# Patient Record
Sex: FEMALE | Race: WHITE
[De-identification: names, ages, dates, MRNs, and addresses within clinical notes are randomized per-mention and may not be internally consistent; named-entity substitution may affect disease eponyms.]

---

## 2017-08-20 VITALS — SYSTOLIC BLOOD PRESSURE: 149 MMHG | DIASTOLIC BLOOD PRESSURE: 76 MMHG

## 2017-10-10 ENCOUNTER — HOSPITAL ENCOUNTER (OUTPATIENT)
Dept: HOSPITAL 61 - RT | Age: 52
Discharge: HOME | End: 2017-10-10
Attending: FAMILY MEDICINE
Payer: MEDICARE

## 2017-10-10 DIAGNOSIS — G47.33: Primary | ICD-10-CM

## 2017-10-10 PROCEDURE — 95810 POLYSOM 6/> YRS 4/> PARAM: CPT

## 2017-10-11 NOTE — SLEEP
DATE OF STUDY:  10/10/2017



SLEEP STUDY



ATTENDING PHYSICIAN:  Dr. Valdivia.



The patient is 52 years old, who weighs 324 pounds with a BMI of 51.  The

patient underwent sleep study at [facility] to rule out RABIA.  This was

a diagnostic study.



During the night study, the patient spent 247 minutes in bed and slept for 171

minutes with a sleep efficiency of 69%.  Sleep latency was 27 minutes with an

absent REM sleep.  Overall, sleep architecture showed increased stage I sleep,

reduced stage II sleep, increased slow wave and absent REM sleep.



During the night study, the patient had 8 obstructive apneas, no mixed or

central apneas.  There were 65 hypopneas.  The patient's apnea-hypopnea index

was 26 per hour.  There was no significant supine sleep seen and no REM sleep

seen.  Review of nocturnal oximetry study revealed a mean oxygen saturation of

95% with the lowest of 70%.  A 59% of time oxygen saturation remained between

80% and 89%.



EKG monitoring revealed irregular rhythm c/w A-fib.  PACs were seen frequently.
  No

sustained arrhythmias were observed.



PLMS were not seen.



The patient did met the criteria for CPAP initiation; however, when technician

offered the CPAP, the patient began to cry profusely and wanted to leave.  The

patient appeared to have a panic attack.  As a result, CPAP could not be

initiated.



IMPRESSION:

1.  Moderate obstructive sleep apnea with an apnea-hypopnea index of 26 per

hour.  Absence of supine and REM sleep can underestimate the severity of sleep

apnea.

2.  Nocturnal hypoxia secondary to obstructive sleep apnea.

3.  No clinically significant periodic limb movements during sleep.



RECOMMENDATIONS:

1.  The patient should return to the sleep lab for CPAP titration.

2.  Once optimum CPAP pressure is achieved, then follow up in 4-6 weeks to

assess compliance with CPAP and to document clinical improvement.

3.  Avoid CNS depressants.

4.  Weight loss is strongly advised.

5.  Caution regarding driving until symptoms of sleep apnea resolve with the

above recommendations.

 



______________________________

WING SELF MD DR:  SARATH/neptali  JOB#:  [id number] / [id number]

DD:  10/11/2017 17:44  DT:  10/11/2017 18:46



chelita Valdivia Dr. 

[facility]

## 2017-11-28 ENCOUNTER — HOSPITAL ENCOUNTER (OUTPATIENT)
Dept: HOSPITAL 61 - CCL | Age: 52
Discharge: HOME | End: 2017-11-28
Attending: INTERNAL MEDICINE
Payer: MEDICARE

## 2017-11-28 VITALS — SYSTOLIC BLOOD PRESSURE: 98 MMHG | DIASTOLIC BLOOD PRESSURE: 57 MMHG

## 2017-11-28 DIAGNOSIS — Z79.01: ICD-10-CM
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
## 2017-11-28 DIAGNOSIS — E66.01: ICD-10-CM

## 2017-11-28 DIAGNOSIS — T82.191A: Primary | ICD-10-CM

## 2017-11-28 LAB
ANION GAP SERPL CALC-SCNC: 10 MMOL/L (ref 6–14)
BLOOD UREA NITROGEN: 26 MG/DL (ref 7–20)
CALCIUM: 9.1 MG/DL (ref 8.5–10.1)
CHLORIDE: 103 MMOL/L (ref 98–107)
CO2 SERPL-SCNC: 27 MMOL/L (ref 21–32)
CREAT SERPL-MCNC: 1 MG/DL (ref 0.6–1)
GFR SERPLBLD BASED ON 1.73 SQ M-ARVRAT: 58 ML/MIN
GLUCOSE SERPL-MCNC: 297 MG/DL (ref 70–99)
HCG SERPL-ACNC: 10.2 X10^3/UL (ref 4–11)
HEMATOCRIT: 45.9 % (ref 36–47)
HEMOGLOBIN: 14.7 G/DL (ref 12–15.5)
INR: 1 (ref 0.8–1.1)
MEAN CORPUSCULAR HEMOGLOBIN: 29 PG (ref 25–35)
MEAN CORPUSCULAR HGB CONC: 32 G/DL (ref 31–37)
MEAN CORPUSCULAR VOLUME: 91 FL (ref 79–100)
PLATELET COUNT: 205 X10^3/UL (ref 140–400)
POTASSIUM SERPL-SCNC: 3.9 MMOL/L (ref 3.5–5.1)
PROTHROMBIN TIME PATIENT: 12.6 SEC (ref 11.7–14)
RED BLOOD COUNT: 5.05 X10^6/UL (ref 3.5–5.4)
RED CELL DISTRIBUTION WIDTH: 15.7 % (ref 11.5–14.5)
SODIUM: 140 MMOL/L (ref 136–145)

## 2017-11-28 PROCEDURE — 33213 INSERT PULSE GEN DUAL LEADS: CPT

## 2017-11-28 PROCEDURE — 99152 MOD SED SAME PHYS/QHP 5/>YRS: CPT

## 2017-11-28 PROCEDURE — 80048 BASIC METABOLIC PNL TOTAL CA: CPT

## 2017-11-28 PROCEDURE — 33228 REMV&REPLC PM GEN DUAL LEAD: CPT

## 2017-11-28 PROCEDURE — 85027 COMPLETE CBC AUTOMATED: CPT

## 2017-11-28 PROCEDURE — 99153 MOD SED SAME PHYS/QHP EA: CPT

## 2017-11-28 PROCEDURE — 85610 PROTHROMBIN TIME: CPT

## 2017-11-28 PROCEDURE — 36415 COLL VENOUS BLD VENIPUNCTURE: CPT

## 2017-11-28 RX ADMIN — LIDOCAINE HYDROCHLORIDE,EPINEPHRINE BITARTRATE 1 ML: 20; .01 INJECTION, SOLUTION INFILTRATION; PERINEURAL at 09:46

## 2017-11-28 RX ADMIN — ONDANSETRON 1 MG: 2 INJECTION INTRAMUSCULAR; INTRAVENOUS at 09:46

## 2017-11-28 RX ADMIN — MIDAZOLAM HYDROCHLORIDE 1 MG: 1 INJECTION, SOLUTION INTRAMUSCULAR; INTRAVENOUS at 09:47

## 2017-11-28 RX ADMIN — VANCOMYCIN HYDROCHLORIDE 1 MLS/HR: 1 INJECTION, POWDER, FOR SOLUTION INTRAVENOUS at 08:49

## 2017-11-28 RX ADMIN — BACITRACIN 1 MLS/HR: 5000 INJECTION, POWDER, FOR SOLUTION INTRAMUSCULAR at 09:46

## 2017-11-28 RX ADMIN — FENTANYL CITRATE 1 MCG: 50 INJECTION INTRAMUSCULAR; INTRAVENOUS at 09:47

## 2017-12-20 ENCOUNTER — HOSPITAL ENCOUNTER (OUTPATIENT)
Dept: HOSPITAL 61 - RAD | Age: 52
Discharge: HOME | End: 2017-12-20
Attending: INTERNAL MEDICINE
Payer: MEDICARE

## 2017-12-20 DIAGNOSIS — Z95.0: ICD-10-CM

## 2017-12-20 DIAGNOSIS — I49.5: Primary | ICD-10-CM

## 2017-12-20 PROCEDURE — 71020: CPT

## 2017-12-20 NOTE — RAD
2 views of the Chest  12/20/2017 2:00 AM



Indication: SICK SINUS SYNDROME.



Comparison:  Chest radiograph August 17, 2017



Findings: No pneumothorax or pleural effusion is identified. No focal

infiltrates are seen. Heart size is normal. 2 pacemaking leads are noted

extending from the left subclavian approach, the distal ends of which appear

to be stable. The pacemaker generator is viewed en face on the frontal chest

radiograph and may have rotated within the pocket. Correlate with pacemaker

function.



Impression: Change in position of the pacemaker generator as described.

Correlate with pacemaker function. Otherwise stable chest.

## 2017-12-26 ENCOUNTER — HOSPITAL ENCOUNTER (INPATIENT)
Dept: HOSPITAL 61 - 2 SOUTH | Age: 52
LOS: 2 days | Discharge: HOME | DRG: 261 | End: 2017-12-28
Attending: INTERNAL MEDICINE | Admitting: INTERNAL MEDICINE
Payer: MEDICARE

## 2017-12-26 VITALS — SYSTOLIC BLOOD PRESSURE: 129 MMHG | DIASTOLIC BLOOD PRESSURE: 64 MMHG

## 2017-12-26 VITALS — SYSTOLIC BLOOD PRESSURE: 129 MMHG | DIASTOLIC BLOOD PRESSURE: 71 MMHG

## 2017-12-26 VITALS — SYSTOLIC BLOOD PRESSURE: 115 MMHG | DIASTOLIC BLOOD PRESSURE: 63 MMHG

## 2017-12-26 VITALS — SYSTOLIC BLOOD PRESSURE: 114 MMHG | DIASTOLIC BLOOD PRESSURE: 69 MMHG

## 2017-12-26 VITALS — BODY MASS INDEX: 45.99 KG/M2 | HEIGHT: 67 IN | WEIGHT: 293 LBS

## 2017-12-26 DIAGNOSIS — Z95.0: ICD-10-CM

## 2017-12-26 DIAGNOSIS — E11.9: ICD-10-CM

## 2017-12-26 DIAGNOSIS — T82.198A: Primary | ICD-10-CM

## 2017-12-26 DIAGNOSIS — Z88.8: ICD-10-CM

## 2017-12-26 DIAGNOSIS — I49.5: ICD-10-CM

## 2017-12-26 DIAGNOSIS — Z79.899: ICD-10-CM

## 2017-12-26 DIAGNOSIS — I10: ICD-10-CM

## 2017-12-26 DIAGNOSIS — Z79.4: ICD-10-CM

## 2017-12-26 DIAGNOSIS — F41.9: ICD-10-CM

## 2017-12-26 DIAGNOSIS — E66.01: ICD-10-CM

## 2017-12-26 DIAGNOSIS — E78.5: ICD-10-CM

## 2017-12-26 DIAGNOSIS — Z91.040: ICD-10-CM

## 2017-12-26 DIAGNOSIS — Z91.018: ICD-10-CM

## 2017-12-26 DIAGNOSIS — Z88.6: ICD-10-CM

## 2017-12-26 DIAGNOSIS — Z88.5: ICD-10-CM

## 2017-12-26 DIAGNOSIS — Z79.51: ICD-10-CM

## 2017-12-26 LAB
ALBUMIN SERPL-MCNC: 3.1 G/DL (ref 3.4–5)
ALBUMIN/GLOB SERPL: 0.7 {RATIO} (ref 1–1.7)
ALP SERPL-CCNC: 97 U/L (ref 46–116)
ALT SERPL-CCNC: 17 U/L (ref 14–59)
ANION GAP SERPL CALC-SCNC: 13 MMOL/L (ref 6–14)
AST SERPL-CCNC: 12 U/L (ref 15–37)
BACTERIA #/AREA URNS HPF: (no result) /HPF
BASOPHILS # BLD AUTO: 0.1 X10^3/UL (ref 0–0.2)
BASOPHILS NFR BLD: 1 % (ref 0–3)
BILIRUB SERPL-MCNC: 0.5 MG/DL (ref 0.2–1)
BILIRUB UR QL STRIP: (no result)
BUN SERPL-MCNC: 30 MG/DL (ref 7–20)
BUN/CREAT SERPL: 25 (ref 6–20)
CALCIUM SERPL-MCNC: 8.4 MG/DL (ref 8.5–10.1)
CHLORIDE SERPL-SCNC: 99 MMOL/L (ref 98–107)
CO2 SERPL-SCNC: 26 MMOL/L (ref 21–32)
CREAT SERPL-MCNC: 1.2 MG/DL (ref 0.6–1)
EOSINOPHIL NFR BLD: 1 % (ref 0–3)
ERYTHROCYTE [DISTWIDTH] IN BLOOD BY AUTOMATED COUNT: 16.2 % (ref 11.5–14.5)
GFR SERPLBLD BASED ON 1.73 SQ M-ARVRAT: 47 ML/MIN
GLOBULIN SER-MCNC: 4.2 G/DL (ref 2.2–3.8)
GLUCOSE SERPL-MCNC: 396 MG/DL (ref 70–99)
GLUCOSE UR STRIP-MCNC: >=1000 MG/DL
HCT VFR BLD CALC: 42.8 % (ref 36–47)
HGB BLD-MCNC: 13.9 G/DL (ref 12–15.5)
INR PPP: 1.1 (ref 0.8–1.1)
LYMPHOCYTES # BLD: 2.3 X10^3/UL (ref 1–4.8)
LYMPHOCYTES NFR BLD AUTO: 23 % (ref 24–48)
MCH RBC QN AUTO: 30 PG (ref 25–35)
MCHC RBC AUTO-ENTMCNC: 33 G/DL (ref 31–37)
MCV RBC AUTO: 92 FL (ref 79–100)
MONOCYTES NFR BLD: 6 % (ref 0–9)
NEUTROPHILS NFR BLD AUTO: 69 % (ref 31–73)
NITRITE UR QL STRIP: NEGATIVE
PH UR STRIP: 5.5 [PH]
PLATELET # BLD AUTO: 175 X10^3/UL (ref 140–400)
POTASSIUM SERPL-SCNC: 4.3 MMOL/L (ref 3.5–5.1)
PROT SERPL-MCNC: 7.3 G/DL (ref 6.4–8.2)
PROT UR STRIP-MCNC: NEGATIVE MG/DL
PROTHROMBIN TIME: 13.2 SEC (ref 11.7–14)
RBC # BLD AUTO: 4.65 X10^6/UL (ref 3.5–5.4)
RBC #/AREA URNS HPF: 0 /HPF (ref 0–2)
SODIUM SERPL-SCNC: 138 MMOL/L (ref 136–145)
SP GR UR STRIP: >=1.03
SQUAMOUS #/AREA URNS LPF: (no result) /LPF
UROBILINOGEN UR-MCNC: 1 MG/DL
WBC # BLD AUTO: 10.1 X10^3/UL (ref 4–11)
WBC #/AREA URNS HPF: (no result) /HPF (ref 0–4)

## 2017-12-26 PROCEDURE — C1769 GUIDE WIRE: HCPCS

## 2017-12-26 PROCEDURE — 85025 COMPLETE CBC W/AUTO DIFF WBC: CPT

## 2017-12-26 PROCEDURE — 82962 GLUCOSE BLOOD TEST: CPT

## 2017-12-26 PROCEDURE — 81001 URINALYSIS AUTO W/SCOPE: CPT

## 2017-12-26 PROCEDURE — 80053 COMPREHEN METABOLIC PANEL: CPT

## 2017-12-26 PROCEDURE — S0028 INJECTION, FAMOTIDINE, 20 MG: HCPCS

## 2017-12-26 PROCEDURE — 87040 BLOOD CULTURE FOR BACTERIA: CPT

## 2017-12-26 PROCEDURE — 85610 PROTHROMBIN TIME: CPT

## 2017-12-26 PROCEDURE — 36415 COLL VENOUS BLD VENIPUNCTURE: CPT

## 2017-12-26 PROCEDURE — 83735 ASSAY OF MAGNESIUM: CPT

## 2017-12-26 PROCEDURE — 71010: CPT

## 2017-12-26 RX ADMIN — NYSTATIN SCH APP: 100000 POWDER TOPICAL at 20:33

## 2017-12-26 RX ADMIN — ATORVASTATIN CALCIUM SCH MG: 40 TABLET, FILM COATED ORAL at 20:32

## 2017-12-26 RX ADMIN — INSULIN ASPART SCH UNITS: 100 INJECTION, SOLUTION INTRAVENOUS; SUBCUTANEOUS at 17:36

## 2017-12-26 RX ADMIN — TIZANIDINE SCH MG: 4 TABLET ORAL at 20:32

## 2017-12-26 RX ADMIN — DICLOFENAC SODIUM SCH APP: 10 GEL TOPICAL at 20:32

## 2017-12-26 RX ADMIN — INSULIN DETEMIR SCH UNITS: 100 INJECTION, SOLUTION SUBCUTANEOUS at 20:39

## 2017-12-26 RX ADMIN — GABAPENTIN SCH MG: 300 CAPSULE ORAL at 16:28

## 2017-12-26 RX ADMIN — NICOTINE SCH PATCH: 14 PATCH, EXTENDED RELEASE TOPICAL at 17:11

## 2017-12-26 RX ADMIN — GABAPENTIN SCH MG: 300 CAPSULE ORAL at 20:32

## 2017-12-26 RX ADMIN — VANCOMYCIN HYDROCHLORIDE SCH MLS/HR: 1 INJECTION, POWDER, FOR SOLUTION INTRAVENOUS at 22:38

## 2017-12-26 RX ADMIN — HYDROCODONE BITARTRATE AND ACETAMINOPHEN PRN TAB: 5; 325 TABLET ORAL at 15:15

## 2017-12-26 RX ADMIN — INSULIN ASPART SCH UNITS: 100 INJECTION, SOLUTION INTRAVENOUS; SUBCUTANEOUS at 17:35

## 2017-12-26 NOTE — PDOC
Provider Note


Provider Note


Please see paper copy of recent clinic visit for full details. 





52 y.o woman with multiple comorbidities underwent pacer generator change. Has 

had pacer migrate in the pocket superficially and position changed. Poor wound 

healing due to DM. Reported fevers at home and worsening erythema of the site. 


No significant change in pacer site since office visit. 





Will check CXR. 


Already plan for pacer pocket revision in OR with Dr. Esparza tomorrow. 


Cultures, labs and abx with vancomycin. 





Supportive care with pain control. Anticipate DC tomorrow depending procedure. 


NPO at midnight. 





Thanks.











MEAGAN GARCIA MD Dec 26, 2017 14:38

## 2017-12-27 VITALS — DIASTOLIC BLOOD PRESSURE: 97 MMHG | SYSTOLIC BLOOD PRESSURE: 142 MMHG

## 2017-12-27 VITALS — DIASTOLIC BLOOD PRESSURE: 69 MMHG | SYSTOLIC BLOOD PRESSURE: 136 MMHG

## 2017-12-27 VITALS — DIASTOLIC BLOOD PRESSURE: 65 MMHG | SYSTOLIC BLOOD PRESSURE: 132 MMHG

## 2017-12-27 VITALS — DIASTOLIC BLOOD PRESSURE: 55 MMHG | SYSTOLIC BLOOD PRESSURE: 92 MMHG

## 2017-12-27 VITALS — SYSTOLIC BLOOD PRESSURE: 133 MMHG | DIASTOLIC BLOOD PRESSURE: 53 MMHG

## 2017-12-27 VITALS — DIASTOLIC BLOOD PRESSURE: 44 MMHG | SYSTOLIC BLOOD PRESSURE: 99 MMHG

## 2017-12-27 VITALS — SYSTOLIC BLOOD PRESSURE: 127 MMHG | DIASTOLIC BLOOD PRESSURE: 59 MMHG

## 2017-12-27 VITALS — DIASTOLIC BLOOD PRESSURE: 54 MMHG | SYSTOLIC BLOOD PRESSURE: 121 MMHG

## 2017-12-27 VITALS — SYSTOLIC BLOOD PRESSURE: 119 MMHG | DIASTOLIC BLOOD PRESSURE: 67 MMHG

## 2017-12-27 VITALS — DIASTOLIC BLOOD PRESSURE: 74 MMHG | SYSTOLIC BLOOD PRESSURE: 147 MMHG

## 2017-12-27 VITALS — SYSTOLIC BLOOD PRESSURE: 132 MMHG | DIASTOLIC BLOOD PRESSURE: 67 MMHG

## 2017-12-27 VITALS — DIASTOLIC BLOOD PRESSURE: 54 MMHG | SYSTOLIC BLOOD PRESSURE: 101 MMHG

## 2017-12-27 PROCEDURE — 4B02XSZ MEASUREMENT OF CARDIAC PACEMAKER, EXTERNAL APPROACH: ICD-10-PCS | Performed by: THORACIC SURGERY (CARDIOTHORACIC VASCULAR SURGERY)

## 2017-12-27 PROCEDURE — 0JWT0PZ REVISION OF CARDIAC RHYTHM RELATED DEVICE IN TRUNK SUBCUTANEOUS TISSUE AND FASCIA, OPEN APPROACH: ICD-10-PCS | Performed by: THORACIC SURGERY (CARDIOTHORACIC VASCULAR SURGERY)

## 2017-12-27 RX ADMIN — ATORVASTATIN CALCIUM SCH MG: 40 TABLET, FILM COATED ORAL at 20:55

## 2017-12-27 RX ADMIN — FENTANYL CITRATE PRN MCG: 50 INJECTION INTRAMUSCULAR; INTRAVENOUS at 14:37

## 2017-12-27 RX ADMIN — INSULIN DETEMIR SCH UNITS: 100 INJECTION, SOLUTION SUBCUTANEOUS at 21:12

## 2017-12-27 RX ADMIN — FENTANYL CITRATE PRN MCG: 50 INJECTION INTRAMUSCULAR; INTRAVENOUS at 14:05

## 2017-12-27 RX ADMIN — HYDROCODONE BITARTRATE AND ACETAMINOPHEN PRN TAB: 5; 325 TABLET ORAL at 16:47

## 2017-12-27 RX ADMIN — TIZANIDINE SCH MG: 4 TABLET ORAL at 20:55

## 2017-12-27 RX ADMIN — NYSTATIN SCH APP: 100000 POWDER TOPICAL at 20:55

## 2017-12-27 RX ADMIN — GABAPENTIN SCH MG: 300 CAPSULE ORAL at 20:55

## 2017-12-27 RX ADMIN — VANCOMYCIN HYDROCHLORIDE SCH MLS/HR: 1 INJECTION, POWDER, FOR SOLUTION INTRAVENOUS at 05:41

## 2017-12-27 RX ADMIN — INSULIN ASPART SCH UNITS: 100 INJECTION, SOLUTION INTRAVENOUS; SUBCUTANEOUS at 11:30

## 2017-12-27 RX ADMIN — INSULIN ASPART SCH UNITS: 100 INJECTION, SOLUTION INTRAVENOUS; SUBCUTANEOUS at 17:00

## 2017-12-27 RX ADMIN — HYDROCODONE BITARTRATE AND ACETAMINOPHEN PRN TAB: 5; 325 TABLET ORAL at 00:47

## 2017-12-27 RX ADMIN — INSULIN ASPART SCH UNITS: 100 INJECTION, SOLUTION INTRAVENOUS; SUBCUTANEOUS at 08:00

## 2017-12-27 RX ADMIN — INSULIN ASPART SCH UNITS: 100 INJECTION, SOLUTION INTRAVENOUS; SUBCUTANEOUS at 12:00

## 2017-12-27 RX ADMIN — PANTOPRAZOLE SODIUM SCH MG: 40 TABLET, DELAYED RELEASE ORAL at 16:46

## 2017-12-27 RX ADMIN — INSULIN ASPART SCH UNITS: 100 INJECTION, SOLUTION INTRAVENOUS; SUBCUTANEOUS at 07:30

## 2017-12-27 RX ADMIN — GABAPENTIN SCH MG: 300 CAPSULE ORAL at 16:46

## 2017-12-27 RX ADMIN — FENTANYL CITRATE PRN MCG: 50 INJECTION INTRAMUSCULAR; INTRAVENOUS at 15:14

## 2017-12-27 RX ADMIN — GABAPENTIN SCH MG: 300 CAPSULE ORAL at 09:00

## 2017-12-27 RX ADMIN — DICLOFENAC SODIUM SCH APP: 10 GEL TOPICAL at 09:00

## 2017-12-27 RX ADMIN — LISINOPRIL SCH MG: 40 TABLET ORAL at 16:46

## 2017-12-27 RX ADMIN — NICOTINE SCH PATCH: 14 PATCH, EXTENDED RELEASE TOPICAL at 17:41

## 2017-12-27 RX ADMIN — DICLOFENAC SODIUM SCH APP: 10 GEL TOPICAL at 20:55

## 2017-12-27 RX ADMIN — NYSTATIN SCH APP: 100000 POWDER TOPICAL at 17:42

## 2017-12-27 RX ADMIN — INSULIN ASPART SCH UNITS: 100 INJECTION, SOLUTION INTRAVENOUS; SUBCUTANEOUS at 16:54

## 2017-12-27 NOTE — PDOC
BRIEF OPERATIVE NOTE


Date:  Dec 27, 2017


Pre-Op Diagnosis


Migration of pacemaker device


Morbid obesity BMI 55


Post-Op Diagnosis


Migration of pacemaker device


Morbid obesity BMI 55


Procedure Performed


Revision of pacemaker pocket


Surgeon


Edison Montejo MD


Assistant


NEO Estes


Anesthesiologist


Dr Jim


Anesthesia Type:  General


Blood Loss


<5 mls


IV Fluid


N/A


Urine Output


N/A


Specimens Obtained


None


Findings


Pacemaker interrogation


Sensing amplitude: atrium 5.1  ventr 5.1


Threshold: atrium 0.6   ventr 0.8


Impednace: atrium 331   ventr 390











EDISON MONTEJO MD Dec 27, 2017 14:51

## 2017-12-27 NOTE — PDOC
CARDIO Progress Notes


Date and Time


Date of Service


12/27/17


Time of Evaluation


1545





Subjective


Subjective:  Other (c/o incisional pain)





Vitals


Vitals





Vital Signs








  Date Time  Temp Pulse Resp B/P (MAP) Pulse Ox O2 Delivery O2 Flow Rate FiO2


 


12/27/17 15:14   17     


 


12/27/17 15:07  68  145/73 94 Nasal Cannula 2 


 


12/27/17 14:52 97.3       





 97.3       








Weight


Weight [ ]





Input and Output


Intake and Output











Intake and Output 


 


 12/27/17





 06:59


 


Intake Total 1240 ml


 


Output Total 1100 ml


 


Balance 140 ml


 


 


 


Intake Oral 1240 ml


 


Output Urine Total 1100 ml











Laboratory


Labs





Laboratory Tests








Test


  12/26/17


16:50 12/26/17


20:30 12/26/17


23:20 12/27/17


07:37


 


Urine Collection Type Unknown    


 


Urine Color Dk yellow    


 


Urine Clarity Hazy    


 


Urine pH 5.5    


 


Urine Specific Gravity >=1.030    


 


Urine Protein


  Negative mg/dL


(NEG-TRACE) 


  


  


 


 


Urine Glucose (UA)


  >=1000 mg/dL


(NEG) 


  


  


 


 


Urine Ketones (Stick)


  Trace mg/dL


(NEG) 


  


  


 


 


Urine Blood Negative (NEG)    


 


Urine Nitrite Negative (NEG)    


 


Urine Bilirubin Small (NEG)    


 


Urine Urobilinogen Dipstick


  1.0 mg/dL (0.2


mg/dL) 


  


  


 


 


Urine Leukocyte Esterase Negative (NEG)    


 


Urine RBC 0 /HPF (0-2)    


 


Urine WBC Occ /HPF (0-4)    


 


Urine Squamous Epithelial


Cells Many /LPF 


  


  


  


 


 


Urine Bacteria


  Few /HPF


(0-FEW) 


  


  


 


 


Urine Hyaline Casts Many /HPF    


 


Urine Mucus Marked /LPF    


 


Glucose (Fingerstick)


  


  123 mg/dL


(70-99) 


  205 mg/dL


(70-99)


 


Magnesium Level


  


  


  2.0 mg/dL


(1.8-2.4) 


 


 


Test


  12/27/17


12:12 12/27/17


14:09 


  


 


 


Glucose (Fingerstick)


  186 mg/dL


(70-99) 173 mg/dL


(70-99) 


  


 











Microbiology


Micro





Microbiology


12/26/17 Blood Culture - Preliminary, Resulted


           NO GROWTH AFTER 1 DAY





Physical Exam


HEENT:  Neck Supple W Full Motion


Chest:  Other (left pectoral drsg intact)


LUNGS:  Clear to Auscultation


Heart:  S1S2, RRR


Abdomen:  Soft N/T, Other (obese)


Extremities:  No Calf Tenderness


Neurology:  alert, oriented, follow commands





Assessment


Assessment


1.  SSS s/p pacemaker with recent generator change


2.  Pacemaker device migration s/p revision of pacemaker pocket; Tolerated 

procedure well. Post-op CXR WNL


3.  Hypertension


4.  Hyperlipidemia


5.  Diabetes








Recommendations


Supportive care


Maintain pain control


Monitor overnight with anticipation of discharge in the am.











ALMA ROSA THOMPSON Dec 27, 2017 16:47

## 2017-12-27 NOTE — RAD
Portable chest x-ray compared to PA and lateral dated 12/20/2017 for pacemaker

pocket revision.



Findings: Lungs are clear. The pacemaker has been reoriented into a suitable

position. Pacemaker leads are unchanged. No pneumothorax. Cardiomediastinum is

grossly unremarkable.



Impression:

1. Status post pacemaker revision. No radiographically discernible

cardiopulmonary abnormality.

## 2017-12-27 NOTE — PDOC4
Operative Note


Operative Note


Date


Dec 27, 2017





Preoperative diagnosis


Migration of pacemaker device


Morbid obesity BMI 55





Postoperative diagnosis


Migration of pacemaker device


Morbid obesity BMI 55





Procedure performed


Revision of pacemaker pocket





Surgeon


Edison Montejo MD





Assistant


NEO Estes





Anesthesiologist


Dr Jim





Anesthesia type


General





Blood loss


<5 mls





IV fluids


N/A





Urine output


N/A





Specimens obtained


None





Indication


The patient is a 52-year-old female with morbid obesity BMI 55-10 days ago 

underwent a pacemaker device exchange. She eventually complained of increased 

pain around the device pocket. She also reported that she could feel the 

device. A chest x-ray confirmed that the device had migrated. Owing to the 

patient's morbid obesity he was extremely technically challenging to reach the 

muscle fascia to secure it. I was consulted to take the patient in the 

operating room to revise the pocket and secured the device to the pectoralis 

major muscle fascia.





Operation


After appropriate identification the patient was transferred to the operating 

room and placed supine on the OR table. Anesthesia was induced by the 

anesthesiologist and the airway was secured with an ET tube. The patient has 

been receiving therapeutic antibiotics and as a result, prophylactic 

antibiotics were not given. The left anterior chest was prepped and draped in 

the usual sterile surgical fashion. A timeout was then performed. The 

previously made skin incision was opened with a scalpel and deepened down 

through the dermis into the pacemaker pocket. The fluid in the pocket did not 

appear infected and was suctioned. The pacemaker device was extracted from the 

pocket. After placing appropriate self-retaining retractors the previously made 

pocket was increased in size. The incision was deepened down to the fascia of 

the pectoralis major muscle. The pocket was extremely deep. The pacemaker 

pocket was thoroughly irrigated with antibiotic solution. A 2-0 silk stitch was 

used to secure the pacemaker device onto the fascia of the muscle. The device 

was brought into the pocket and comfortably seated at the base of the pocket 

down on the fascia. No Vicryl stitch was used to close the deep subcutaneous 

tissue layers overlying the device. 2 layers of 2-0 Vicryl were used to 

reapproximate the subcutaneous fat. The epidermis was closed with 4-0 Monocryl. 

Dermabond followed by a sterile dressing were applied. At the end of procedure 

the instrument, sponge and needle counts were correct. The pacemaker was 

interrogated with appropriate sensing amplitude, threshold and impedance for 

both the atrial and ventricular leads:





Sensing amplitude: atrium 5.1  ventr 5.1


Threshold: atrium 0.6   ventr 0.8


Impednace: atrium 331   ventr 390





Anesthesia was reversed, the patient was extubated and transferred to the PACU 

in stable condition having tolerated the procedure well.











EDISON MONTEJO MD Dec 27, 2017 15:06

## 2017-12-28 VITALS — DIASTOLIC BLOOD PRESSURE: 78 MMHG | SYSTOLIC BLOOD PRESSURE: 125 MMHG

## 2017-12-28 VITALS — SYSTOLIC BLOOD PRESSURE: 110 MMHG | DIASTOLIC BLOOD PRESSURE: 46 MMHG

## 2017-12-28 VITALS — DIASTOLIC BLOOD PRESSURE: 68 MMHG | SYSTOLIC BLOOD PRESSURE: 136 MMHG

## 2017-12-28 RX ADMIN — LISINOPRIL SCH MG: 40 TABLET ORAL at 08:46

## 2017-12-28 RX ADMIN — PANTOPRAZOLE SODIUM SCH MG: 40 TABLET, DELAYED RELEASE ORAL at 08:40

## 2017-12-28 RX ADMIN — INSULIN ASPART SCH UNITS: 100 INJECTION, SOLUTION INTRAVENOUS; SUBCUTANEOUS at 08:48

## 2017-12-28 RX ADMIN — GABAPENTIN SCH MG: 300 CAPSULE ORAL at 08:40

## 2017-12-28 RX ADMIN — HYDROCODONE BITARTRATE AND ACETAMINOPHEN PRN TAB: 5; 325 TABLET ORAL at 01:15

## 2017-12-28 RX ADMIN — NYSTATIN SCH APP: 100000 POWDER TOPICAL at 08:40

## 2017-12-28 RX ADMIN — INSULIN ASPART SCH UNITS: 100 INJECTION, SOLUTION INTRAVENOUS; SUBCUTANEOUS at 08:59

## 2017-12-28 RX ADMIN — HYDROCODONE BITARTRATE AND ACETAMINOPHEN PRN TAB: 5; 325 TABLET ORAL at 09:00

## 2017-12-28 RX ADMIN — NICOTINE SCH PATCH: 14 PATCH, EXTENDED RELEASE TOPICAL at 08:40

## 2017-12-28 NOTE — PDOC3
Discharge Summary


Visit Information


Date of Admission:  Dec 26, 2017


Date of Discharge:  Dec 28, 2017


Admitting Diagnosis:  Pacemaker migration


Final Diagnosis


pacemaker pocket revision





Brief Hospital Course


Allergies





 Allergies








Coded Allergies Type Severity Reaction Last Updated Verified


 


  Penicillins Allergy Intermediate hives 12/26/17 Yes


 


  apple Allergy Intermediate  12/26/17 Yes


 


  carrot Allergy Intermediate  12/26/17 Yes


 


  latex Allergy Intermediate hives 12/26/17 Yes


 


  acetaminophen Adverse Reaction Intermediate nausea 12/26/17 Yes


 


  hydromorphone Adverse Reaction Intermediate nausea 12/26/17 Yes


 


  morphine Adverse Reaction Intermediate Panic attack 12/26/17 Yes


 


  propoxyphene Adverse Reaction Intermediate nausea 12/26/17 Yes








Vital Signs





Vital Signs








  Date Time  Temp Pulse Resp B/P (MAP) Pulse Ox O2 Delivery O2 Flow Rate FiO2


 


12/28/17 10:52 98.2 68 22 110/46 (67) 96 Room Air  





 98.2       


 


12/28/17 09:00       2.0 








Lab Results





Laboratory Tests








Test


  12/26/17


16:50 12/26/17


20:30 12/26/17


23:20 12/27/17


07:37


 


Urine Collection Type Unknown    


 


Urine Color Dk yellow    


 


Urine Clarity Hazy    


 


Urine pH 5.5    


 


Urine Specific Gravity >=1.030    


 


Urine Protein


  Negative mg/dL


(NEG-TRACE) 


  


  


 


 


Urine Glucose (UA)


  >=1000 mg/dL


(NEG) 


  


  


 


 


Urine Ketones (Stick)


  Trace mg/dL


(NEG) 


  


  


 


 


Urine Blood Negative (NEG)    


 


Urine Nitrite Negative (NEG)    


 


Urine Bilirubin Small (NEG)    


 


Urine Urobilinogen Dipstick


  1.0 mg/dL (0.2


mg/dL) 


  


  


 


 


Urine Leukocyte Esterase Negative (NEG)    


 


Urine RBC 0 /HPF (0-2)    


 


Urine WBC Occ /HPF (0-4)    


 


Urine Squamous Epithelial


Cells Many /LPF 


  


  


  


 


 


Urine Bacteria


  Few /HPF


(0-FEW) 


  


  


 


 


Urine Hyaline Casts Many /HPF    


 


Urine Mucus Marked /LPF    


 


Glucose (Fingerstick)


  


  123 mg/dL


(70-99) 


  205 mg/dL


(70-99)


 


Magnesium Level


  


  


  2.0 mg/dL


(1.8-2.4) 


 


 


Test


  12/27/17


12:12 12/27/17


14:09 12/27/17


16:29 12/27/17


20:57


 


Glucose (Fingerstick)


  186 mg/dL


(70-99) 173 mg/dL


(70-99) 359 mg/dL


(70-99) 307 mg/dL


(70-99)


 


Test


  12/28/17


00:31 12/28/17


11:20 


  


 


 


Glucose (Fingerstick)


  312 mg/dL


(70-99) 202 mg/dL


(70-99) 


  


 








Laboratory Tests








Test


  12/27/17


16:29 12/27/17


20:57 12/28/17


00:31 12/28/17


11:20


 


Glucose (Fingerstick)


  359 mg/dL


(70-99) 307 mg/dL


(70-99) 312 mg/dL


(70-99) 202 mg/dL


(70-99)








Brief Hospital Course


Ms. Jose morrison 52-year-old woman who underwent a pacemaker generator change 

approximate 4 weeks ago and had pacemaker migration due to significant 

subcutaneous fat. She presented for pacemaker pocket revision. Monitor 

administered for prophylaxis. She underwent successful pacemaker pocket 

revision with cardiac surgery. Post procedure chest x-ray revealed excellent 

placement. Interrogation of the device was unremarkable. The patient was given 

Percocet for pain control for 5 days. On the day of discharge she had some mild 

chest discomfort related to her procedure but no other symptoms. Blood pressure 

and heart rate well-controlled.





Discharge Information


Scheduled


Albuterol Sulfate (Ventolin Hfa Inhaler), 2 PUFF INH Q4HRS, (Reported)


Amlodipine Besylate (Amlodipine Besylate), 5 MG PO DAILY, (Reported)


Apixaban (Eliquis), 5 MG PO BID, (Reported)


Atorvastatin Calcium (Atorvastatin Calcium), 0.5 TAB PO DAILY, (Reported)


Budesonide/Formoterol Fumarate (Symbicort 160-4.5 Mcg Inhaler), 2 PUFF IH BID, (

Reported)


Gabapentin (Gabapentin), 300 MG PO TID, (Reported)


Insulin Aspart (Novolog), 60 UNIT SQ TIDAC, (Reported)


Insulin Glargine,Hum.rec.anlog (Lantus Solostar), 50 UNIT SQ QHS, (Reported)


Lisinopril (Lisinopril), 1 TAB PO DAILY


Magnesium Oxide (Magnesium), 400 MG PO QODAY, (Reported)


Metformin Hcl (Metformin Hcl), 1,000 MG PO BIDWMEALS, (Reported)


Omeprazole (Omeprazole), 1 CAP PO DAILY, (Reported)


Tizanidine Hcl (Tizanidine Hcl), 1 TAB PO QHS, (Reported)





Scheduled PRN


Cyclobenzaprine Hcl (Cyclobenzaprine Hcl), 1 TAB PO QHS PRN for MUSCLE SPASMS, (

Reported)


Hydrocodone/Apap  (Norco  Tablet), 1 TAB PO PRN Q6HRS PRN for PAIN, 

(Reported)


Lorazepam (Lorazepam), 0.5 MG PO BID PRN for ANXIETY / AGITATION, (Reported)





Discontinued Medications


Amlodipine Besylate (Amlodipine Besylate), 5 MG PO DAILY, (Reported)











MEAGAN GARCAI MD Dec 28, 2017 14:53

## 2018-10-07 VITALS — SYSTOLIC BLOOD PRESSURE: 100 MMHG | DIASTOLIC BLOOD PRESSURE: 60 MMHG

## 2019-02-14 ENCOUNTER — HOSPITAL ENCOUNTER (OUTPATIENT)
Dept: HOSPITAL 61 - RAD | Age: 54
Discharge: HOME | End: 2019-02-14
Attending: INTERNAL MEDICINE
Payer: MEDICARE

## 2019-02-14 DIAGNOSIS — Z95.0: ICD-10-CM

## 2019-02-14 DIAGNOSIS — Z48.812: Primary | ICD-10-CM

## 2019-02-14 PROCEDURE — 71046 X-RAY EXAM CHEST 2 VIEWS: CPT

## 2019-02-14 NOTE — RAD
EXAM: Chest, 2 views.

 

HISTORY: Pacemaker.

 

COMPARISON: 10/5/2018

 

FINDINGS: 2 views of the chest are obtained. There is no infiltrate, 

pleural effusion or pneumothorax. There is a cardiac pacemaker generator 

within the left chest wall oriented nearly perpendicular to the chest 

wall. There is a lead within the right atrial appendage and a second lead 

looped with the tip likely within the right lateral ventricle. This is 

unchanged compared to the prior study. There is a stable prominent cardiac

silhouette.

 

IMPRESSION: 

1. No acute pulmonary finding.

2. Dual lead cardiac pacemaker unchanged in position.

 

Electronically signed by: Kamla Robles MD (2/14/2019 1:53 PM) [id number]